# Patient Record
Sex: MALE | Race: WHITE | NOT HISPANIC OR LATINO | Employment: OTHER | URBAN - METROPOLITAN AREA
[De-identification: names, ages, dates, MRNs, and addresses within clinical notes are randomized per-mention and may not be internally consistent; named-entity substitution may affect disease eponyms.]

---

## 2023-05-17 ENCOUNTER — OFFICE VISIT (OUTPATIENT)
Dept: PRIMARY CARE | Facility: CLINIC | Age: 78
End: 2023-05-17
Payer: MEDICARE

## 2023-05-17 ENCOUNTER — LAB (OUTPATIENT)
Dept: LAB | Facility: LAB | Age: 78
End: 2023-05-17
Payer: MEDICARE

## 2023-05-17 VITALS
WEIGHT: 237 LBS | TEMPERATURE: 97.5 F | DIASTOLIC BLOOD PRESSURE: 84 MMHG | BODY MASS INDEX: 37.2 KG/M2 | OXYGEN SATURATION: 96 % | SYSTOLIC BLOOD PRESSURE: 139 MMHG | HEIGHT: 67 IN | HEART RATE: 79 BPM

## 2023-05-17 DIAGNOSIS — Z00.00 VISIT FOR PREVENTIVE HEALTH EXAMINATION: ICD-10-CM

## 2023-05-17 DIAGNOSIS — I10 BENIGN ESSENTIAL HYPERTENSION: ICD-10-CM

## 2023-05-17 DIAGNOSIS — Z12.5 ENCOUNTER FOR SCREENING FOR MALIGNANT NEOPLASM OF PROSTATE: ICD-10-CM

## 2023-05-17 DIAGNOSIS — Z00.00 VISIT FOR PREVENTIVE HEALTH EXAMINATION: Primary | ICD-10-CM

## 2023-05-17 DIAGNOSIS — N18.31 STAGE 3A CHRONIC KIDNEY DISEASE (MULTI): ICD-10-CM

## 2023-05-17 DIAGNOSIS — E78.00 HYPERCHOLESTEROLEMIA: ICD-10-CM

## 2023-05-17 DIAGNOSIS — M54.2 NECK PAIN, CHRONIC: ICD-10-CM

## 2023-05-17 DIAGNOSIS — K21.9 GASTROESOPHAGEAL REFLUX DISEASE WITHOUT ESOPHAGITIS: ICD-10-CM

## 2023-05-17 DIAGNOSIS — N40.1 BENIGN PROSTATIC HYPERPLASIA WITH LOWER URINARY TRACT SYMPTOMS, SYMPTOM DETAILS UNSPECIFIED: ICD-10-CM

## 2023-05-17 DIAGNOSIS — G89.29 NECK PAIN, CHRONIC: ICD-10-CM

## 2023-05-17 PROBLEM — K63.5 COLON POLYP: Status: ACTIVE | Noted: 2023-05-17

## 2023-05-17 PROBLEM — N18.30 CHRONIC KIDNEY DISEASE, STAGE 3 (MULTI): Status: ACTIVE | Noted: 2023-05-17

## 2023-05-17 PROBLEM — N40.0 BPH (BENIGN PROSTATIC HYPERPLASIA): Status: ACTIVE | Noted: 2023-05-17

## 2023-05-17 PROBLEM — G47.33 OSA ON CPAP: Status: ACTIVE | Noted: 2023-05-17

## 2023-05-17 PROBLEM — M19.90 OSTEOARTHRITIS: Status: ACTIVE | Noted: 2023-05-17

## 2023-05-17 LAB
ALANINE AMINOTRANSFERASE (SGPT) (U/L) IN SER/PLAS: 18 U/L (ref 10–52)
ALBUMIN (G/DL) IN SER/PLAS: 4.6 G/DL (ref 3.4–5)
ALKALINE PHOSPHATASE (U/L) IN SER/PLAS: 87 U/L (ref 33–136)
ANION GAP IN SER/PLAS: 14 MMOL/L (ref 10–20)
ASPARTATE AMINOTRANSFERASE (SGOT) (U/L) IN SER/PLAS: 22 U/L (ref 9–39)
BILIRUBIN TOTAL (MG/DL) IN SER/PLAS: 0.5 MG/DL (ref 0–1.2)
CALCIUM (MG/DL) IN SER/PLAS: 10.1 MG/DL (ref 8.6–10.6)
CARBON DIOXIDE, TOTAL (MMOL/L) IN SER/PLAS: 28 MMOL/L (ref 21–32)
CHLORIDE (MMOL/L) IN SER/PLAS: 104 MMOL/L (ref 98–107)
CHOLESTEROL (MG/DL) IN SER/PLAS: 148 MG/DL (ref 0–199)
CHOLESTEROL IN HDL (MG/DL) IN SER/PLAS: 44.7 MG/DL
CHOLESTEROL/HDL RATIO: 3.3
CREATININE (MG/DL) IN SER/PLAS: 1.5 MG/DL (ref 0.5–1.3)
ERYTHROCYTE DISTRIBUTION WIDTH (RATIO) BY AUTOMATED COUNT: 13.2 % (ref 11.5–14.5)
ERYTHROCYTE MEAN CORPUSCULAR HEMOGLOBIN CONCENTRATION (G/DL) BY AUTOMATED: 30.4 G/DL (ref 32–36)
ERYTHROCYTE MEAN CORPUSCULAR VOLUME (FL) BY AUTOMATED COUNT: 96 FL (ref 80–100)
ERYTHROCYTES (10*6/UL) IN BLOOD BY AUTOMATED COUNT: 5.27 X10E12/L (ref 4.5–5.9)
GFR MALE: 47 ML/MIN/1.73M2
GLUCOSE (MG/DL) IN SER/PLAS: 110 MG/DL (ref 74–99)
HEMATOCRIT (%) IN BLOOD BY AUTOMATED COUNT: 50.6 % (ref 41–52)
HEMOGLOBIN (G/DL) IN BLOOD: 15.4 G/DL (ref 13.5–17.5)
LDL: 88 MG/DL (ref 0–99)
LEUKOCYTES (10*3/UL) IN BLOOD BY AUTOMATED COUNT: 7.4 X10E9/L (ref 4.4–11.3)
MUCUS, URINE: NORMAL /LPF
NRBC (PER 100 WBCS) BY AUTOMATED COUNT: 0 /100 WBC (ref 0–0)
PLATELETS (10*3/UL) IN BLOOD AUTOMATED COUNT: 265 X10E9/L (ref 150–450)
POTASSIUM (MMOL/L) IN SER/PLAS: 5.1 MMOL/L (ref 3.5–5.3)
PROSTATE SPECIFIC ANTIGEN,SCREEN: 1.53 NG/ML (ref 0–4)
PROTEIN TOTAL: 7.8 G/DL (ref 6.4–8.2)
RBC, URINE: NORMAL /HPF (ref 0–5)
SODIUM (MMOL/L) IN SER/PLAS: 141 MMOL/L (ref 136–145)
SQUAMOUS EPITHELIAL CELLS, URINE: <1 /HPF
THYROTROPIN (MIU/L) IN SER/PLAS BY DETECTION LIMIT <= 0.05 MIU/L: 1.6 MIU/L (ref 0.44–3.98)
TRIGLYCERIDE (MG/DL) IN SER/PLAS: 76 MG/DL (ref 0–149)
UREA NITROGEN (MG/DL) IN SER/PLAS: 20 MG/DL (ref 6–23)
VLDL: 15 MG/DL (ref 0–40)
WBC, URINE: 1 /HPF (ref 0–5)

## 2023-05-17 PROCEDURE — 99214 OFFICE O/P EST MOD 30 MIN: CPT | Performed by: FAMILY MEDICINE

## 2023-05-17 PROCEDURE — 80061 LIPID PANEL: CPT

## 2023-05-17 PROCEDURE — 80053 COMPREHEN METABOLIC PANEL: CPT

## 2023-05-17 PROCEDURE — 84153 ASSAY OF PSA TOTAL: CPT

## 2023-05-17 PROCEDURE — 1157F ADVNC CARE PLAN IN RCRD: CPT | Performed by: FAMILY MEDICINE

## 2023-05-17 PROCEDURE — 81001 URINALYSIS AUTO W/SCOPE: CPT

## 2023-05-17 PROCEDURE — 3079F DIAST BP 80-89 MM HG: CPT | Performed by: FAMILY MEDICINE

## 2023-05-17 PROCEDURE — G0444 DEPRESSION SCREEN ANNUAL: HCPCS | Performed by: FAMILY MEDICINE

## 2023-05-17 PROCEDURE — 84443 ASSAY THYROID STIM HORMONE: CPT

## 2023-05-17 PROCEDURE — 99497 ADVNCD CARE PLAN 30 MIN: CPT | Performed by: FAMILY MEDICINE

## 2023-05-17 PROCEDURE — 36415 COLL VENOUS BLD VENIPUNCTURE: CPT

## 2023-05-17 PROCEDURE — 1159F MED LIST DOCD IN RCRD: CPT | Performed by: FAMILY MEDICINE

## 2023-05-17 PROCEDURE — G0439 PPPS, SUBSEQ VISIT: HCPCS | Performed by: FAMILY MEDICINE

## 2023-05-17 PROCEDURE — 85027 COMPLETE CBC AUTOMATED: CPT

## 2023-05-17 PROCEDURE — 1160F RVW MEDS BY RX/DR IN RCRD: CPT | Performed by: FAMILY MEDICINE

## 2023-05-17 PROCEDURE — 3075F SYST BP GE 130 - 139MM HG: CPT | Performed by: FAMILY MEDICINE

## 2023-05-17 PROCEDURE — 1170F FXNL STATUS ASSESSED: CPT | Performed by: FAMILY MEDICINE

## 2023-05-17 RX ORDER — EPINEPHRINE 0.22MG
100 AEROSOL WITH ADAPTER (ML) INHALATION 2 TIMES DAILY
COMMUNITY
Start: 2014-06-04

## 2023-05-17 RX ORDER — TAMSULOSIN HYDROCHLORIDE 0.4 MG/1
0.4 CAPSULE ORAL NIGHTLY
Qty: 90 CAPSULE | Refills: 3 | Status: SHIPPED | OUTPATIENT
Start: 2023-05-17 | End: 2024-05-08 | Stop reason: SDUPTHER

## 2023-05-17 RX ORDER — OMEPRAZOLE 20 MG/1
20 CAPSULE, DELAYED RELEASE ORAL
Qty: 90 CAPSULE | Refills: 3 | Status: SHIPPED | OUTPATIENT
Start: 2023-05-17 | End: 2024-05-08 | Stop reason: SDUPTHER

## 2023-05-17 RX ORDER — ATORVASTATIN CALCIUM 10 MG/1
10 TABLET, FILM COATED ORAL DAILY
Qty: 90 TABLET | Refills: 3 | Status: SHIPPED | OUTPATIENT
Start: 2023-05-17 | End: 2024-05-08 | Stop reason: SDUPTHER

## 2023-05-17 RX ORDER — FLUTICASONE PROPIONATE 50 MCG
1 SPRAY, SUSPENSION (ML) NASAL 2 TIMES DAILY
COMMUNITY
Start: 2022-06-06

## 2023-05-17 RX ORDER — AMLODIPINE BESYLATE 5 MG/1
1 TABLET ORAL DAILY
COMMUNITY
Start: 2021-07-23 | End: 2023-05-17 | Stop reason: ALTCHOICE

## 2023-05-17 RX ORDER — DAPAGLIFLOZIN 10 MG/1
10 TABLET, FILM COATED ORAL
COMMUNITY
Start: 2022-12-01 | End: 2023-06-05 | Stop reason: SDUPTHER

## 2023-05-17 RX ORDER — LATANOPROST 50 UG/ML
SOLUTION/ DROPS OPHTHALMIC
COMMUNITY
Start: 2013-04-15

## 2023-05-17 RX ORDER — AMLODIPINE BESYLATE 10 MG/1
10 TABLET ORAL DAILY
Qty: 30 TABLET | Refills: 3 | Status: SHIPPED | OUTPATIENT
Start: 2023-05-17 | End: 2023-07-31 | Stop reason: SDUPTHER

## 2023-05-17 RX ORDER — OMEPRAZOLE 20 MG/1
20 CAPSULE, DELAYED RELEASE ORAL
COMMUNITY
Start: 2022-06-07 | End: 2023-05-17 | Stop reason: SDUPTHER

## 2023-05-17 RX ORDER — AMLODIPINE BESYLATE 5 MG/1
5 TABLET ORAL DAILY
Status: CANCELLED | OUTPATIENT
Start: 2023-05-17

## 2023-05-17 RX ORDER — ATORVASTATIN CALCIUM 10 MG/1
1 TABLET, FILM COATED ORAL DAILY
COMMUNITY
Start: 2014-05-13 | End: 2023-05-17 | Stop reason: SDUPTHER

## 2023-05-17 RX ORDER — TAMSULOSIN HYDROCHLORIDE 0.4 MG/1
1 CAPSULE ORAL NIGHTLY
COMMUNITY
Start: 2016-06-13 | End: 2023-05-17 | Stop reason: SDUPTHER

## 2023-05-17 RX ORDER — DEXTROMETHORPHAN HYDROBROMIDE, GUAIFENESIN 5; 100 MG/5ML; MG/5ML
LIQUID ORAL
COMMUNITY
Start: 2019-07-03

## 2023-05-17 RX ORDER — HYDROCORTISONE 25 MG/G
CREAM TOPICAL
COMMUNITY
Start: 2020-07-22 | End: 2023-10-24 | Stop reason: SDUPTHER

## 2023-05-17 RX ORDER — LISINOPRIL 20 MG/1
1 TABLET ORAL DAILY
COMMUNITY
Start: 2014-04-21 | End: 2023-07-31 | Stop reason: SDUPTHER

## 2023-05-17 RX ORDER — CHOLECALCIFEROL (VITAMIN D3) 25 MCG
TABLET ORAL
COMMUNITY
Start: 2020-04-13

## 2023-05-17 ASSESSMENT — ACTIVITIES OF DAILY LIVING (ADL)
BATHING: INDEPENDENT
DRESSING: INDEPENDENT
TAKING_MEDICATION: INDEPENDENT
GROCERY_SHOPPING: INDEPENDENT
DOING_HOUSEWORK: INDEPENDENT
MANAGING_FINANCES: INDEPENDENT

## 2023-05-17 ASSESSMENT — PATIENT HEALTH QUESTIONNAIRE - PHQ9
2. FEELING DOWN, DEPRESSED OR HOPELESS: NOT AT ALL
SUM OF ALL RESPONSES TO PHQ9 QUESTIONS 1 AND 2: 0
1. LITTLE INTEREST OR PLEASURE IN DOING THINGS: NOT AT ALL
1. LITTLE INTEREST OR PLEASURE IN DOING THINGS: NOT AT ALL
SUM OF ALL RESPONSES TO PHQ9 QUESTIONS 1 AND 2: 0
2. FEELING DOWN, DEPRESSED OR HOPELESS: NOT AT ALL

## 2023-05-17 ASSESSMENT — LIFESTYLE VARIABLES
HOW OFTEN DO YOU HAVE A DRINK CONTAINING ALCOHOL: 4 OR MORE TIMES A WEEK
SKIP TO QUESTIONS 9-10: 1
HOW MANY STANDARD DRINKS CONTAINING ALCOHOL DO YOU HAVE ON A TYPICAL DAY: 1 OR 2
HOW OFTEN DO YOU HAVE SIX OR MORE DRINKS ON ONE OCCASION: NEVER
AUDIT-C TOTAL SCORE: 4

## 2023-05-17 NOTE — PROGRESS NOTES
Subjective   Patient ID: Freedom Sahu is a 77 y.o. male who presents for Medicare Annual Wellness Visit Subsequent.    Assessment/Plan     Problem List Items Addressed This Visit          Nervous    Neck pain, chronic    Relevant Orders    Referral to Physical Therapy       Circulatory    Benign essential hypertension    Relevant Medications    amLODIPine (Norvasc) 10 mg tablet    Other Relevant Orders    TSH with reflex to Free T4 if abnormal    Lipid Panel       Digestive    GERD (gastroesophageal reflux disease)    Relevant Medications    omeprazole (PriLOSEC) 20 mg DR capsule       Genitourinary    BPH (benign prostatic hyperplasia)    Relevant Medications    tamsulosin (Flomax) 0.4 mg 24 hr capsule    Chronic kidney disease, stage 3 (CMS/HCC)    Relevant Orders    Follow Up In Primary Care    Prostate Specific Antigen, Screen    Urinalysis Microscopic Only    TSH with reflex to Free T4 if abnormal    Lipid Panel    Comprehensive Metabolic Panel    CBC    Referral to Physical Therapy       Other    Visit for preventive health examination - Primary    Relevant Orders    Follow Up In Primary Care    Prostate Specific Antigen, Screen    Urinalysis Microscopic Only    TSH with reflex to Free T4 if abnormal    Lipid Panel    Comprehensive Metabolic Panel    CBC    Referral to Physical Therapy    Hypercholesterolemia    Relevant Medications    atorvastatin (Lipitor) 10 mg tablet    Other Relevant Orders    TSH with reflex to Free T4 if abnormal    Lipid Panel     Other Visit Diagnoses       Encounter for screening for malignant neoplasm of prostate        Relevant Orders    Prostate Specific Antigen, Screen          Up-to-date with pneumonia vaccine and received COVID-19 vaccine  Fasting lab work today  Physical therapy information provided  Discussed about diet physical activity weight loss  Discussed about aspirin use  Discussed about diet and exercise     DEXA scan- 2017 within normal limits  AAA screening -2017  no acute abnormality  Lab work and call patient with abnormal result  Come back early with any new sign and symptoms  Patient understands and in agreement with plan.    HPI      77-year-old male here for Medicare wellness visit and follow-up on    GERD  BPH  Vitamin D deficiency  Hypertension  Hyperlipidemia   Glaucoma  MAL using CPAP  Cigar smoker  Obesity  Tubular Adenoma -colonoscopy done in 2020  Osteoarthritis status post bilateral knee and bilateral hip replacement and left shoulder  Chronic low back pain  Generalized arthralgias secondary to osteoarthritis    Ckd stage 3 - seeping dr. Stewart - was ref to he-onc    Chronic neck pain - more on left side - no weakness will consider PT     Repeat blood pressure is 139/84  We will increase amlodipine as blood pressure usually runs high     Chronic left-sided neck pain  Taking Mobic 3 times a week  Exercising regularly  Otherwise no new sign and symptoms     Advance directives:. Advanced Care Planning discussed and documented advance care plan or surrogate decision maker documented in the medical record.   A Conversation about Advance directives of at least 16 minutes has occurred. Actual time spent: I spent >16 minutes discussing Advance Care Planning including the explanation and discussion of advance directives. If patient does not have current up to date documents, examples and information provided on how to create both Living Will and Power of . Patient was encouraged to work on completing these documents.  Conversation with: The conversation with the patient and/or participants was voluntary.  Documents discussed and/or completed: Living Will was discussed with participants. Healthcare POA was discussed with participants. Patient educated on how to obtain Living Will and Power of . Discussed patient end of life choices.   Patient has living will and patient's daughter hay is healthcare power of .  Patient is full code.    No Known  "Allergies    Current Outpatient Medications   Medication Sig Dispense Refill    acetaminophen (Tylenol 8 Hour) 650 mg ER tablet Take by mouth.      cholecalciferol (Vitamin D-3) 25 MCG (1000 UT) tablet Take by mouth.      coenzyme Q-10 100 mg capsule Take 1 capsule (100 mg) by mouth twice a day.      Farxiga 10 mg 1 tablet (10 mg).      fluticasone (Flonase) 50 mcg/actuation nasal spray Administer 1 spray into affected nostril(s) 2 times a day.      hydrocortisone 2.5 % cream Apply topically.      latanoprost (Xalatan) 0.005 % ophthalmic solution       lisinopril 20 mg tablet Take 1 tablet (20 mg) by mouth once daily.      amLODIPine (Norvasc) 10 mg tablet Take 1 tablet (10 mg) by mouth once daily. 30 tablet 3    atorvastatin (Lipitor) 10 mg tablet Take 1 tablet (10 mg) by mouth once daily. 90 tablet 3    omeprazole (PriLOSEC) 20 mg DR capsule Take 1 capsule (20 mg) by mouth once daily. 90 capsule 3    tamsulosin (Flomax) 0.4 mg 24 hr capsule Take 1 capsule (0.4 mg) by mouth once daily at bedtime. 90 capsule 3     No current facility-administered medications for this visit.       Objective   Visit Vitals  /84   Pulse 79   Temp 36.4 °C (97.5 °F)   Ht 1.702 m (5' 7\")   Wt 108 kg (237 lb)   SpO2 96%   BMI 37.12 kg/m²   Smoking Status Some Days   BSA 2.26 m²     Physical Exam  Constitutional:       General: He is not in acute distress.     Appearance: Normal appearance.   HENT:      Head: Normocephalic and atraumatic.      Nose: Nose normal.   Eyes:      Extraocular Movements: Extraocular movements intact.      Conjunctiva/sclera: Conjunctivae normal.   Cardiovascular:      Rate and Rhythm: Normal rate and regular rhythm.   Pulmonary:      Effort: Pulmonary effort is normal.      Breath sounds: Normal breath sounds.   Skin:     General: Skin is warm.   Neurological:      Mental Status: He is alert and oriented to person, place, and time.   Psychiatric:         Mood and Affect: Mood normal.         Behavior: " Behavior normal.   Immunization History   Administered Date(s) Administered    Influenza, High Dose Seasonal, Preservative Free 12/11/2017, 09/12/2018, 10/03/2018, 09/10/2019    Influenza, High-dose Seasonal, Quadrivalent, Preservative Free 09/19/2022    Influenza, Seasonal, Quadrivalent, Adjuvanted 10/25/2021    Influenza, Unspecified 12/10/2009, 09/04/2013    Influenza, trivalent, adjuvanted 11/19/2019    Moderna SARS-CoV-2 Vaccination 01/24/2021, 02/24/2021, 12/28/2021    Pneumococcal Conjugate PCV 13 06/23/2017    Pneumococcal Polysaccharide PPSV23 05/10/2010, 04/17/2013       Review of Systems    No visits with results within 4 Month(s) from this visit.   Latest known visit with results is:   Legacy Encounter on 11/28/2022   Component Date Value Ref Range Status    Uric Acid 11/28/2022 7.2  4.0 - 7.5 mg/dL Final    Glucose 11/28/2022 85  74 - 99 mg/dL Final    Sodium 11/28/2022 140  136 - 145 mmol/L Final    Potassium 11/28/2022 4.4  3.5 - 5.3 mmol/L Final    Chloride 11/28/2022 105  98 - 107 mmol/L Final    Bicarbonate 11/28/2022 27  21 - 32 mmol/L Final    Anion Gap 11/28/2022 12  10 - 20 mmol/L Final    Urea Nitrogen 11/28/2022 24 (H)  6 - 23 mg/dL Final    Creatinine 11/28/2022 1.58 (H)  0.50 - 1.30 mg/dL Final    GFR MALE 11/28/2022 44 (A)  >90 mL/min/1.73m2 Final    Calcium 11/28/2022 10.2  8.6 - 10.6 mg/dL Final       Radiology: Reviewed imaging in powerchart.  No results found.    Family History   Problem Relation Name Age of Onset    Hypertension Mother Margaret Sahu      Social History     Socioeconomic History    Marital status:      Spouse name: None    Number of children: None    Years of education: None    Highest education level: None   Occupational History    None   Tobacco Use    Smoking status: Some Days     Types: Cigars    Smokeless tobacco: Never    Tobacco comments:     5-6 cigars every week for 50years   Vaping Use    Vaping status: None   Substance and Sexual Activity    Alcohol  use: Yes     Alcohol/week: 52.0 standard drinks of alcohol     Types: 2 Cans of beer, 50 Shots of liquor per week    Drug use: Never    Sexual activity: Not Currently     Partners: Female   Other Topics Concern    None   Social History Narrative    None     Social Determinants of Health     Financial Resource Strain: Not on file   Food Insecurity: Not on file   Transportation Needs: Not on file   Physical Activity: Not on file   Stress: Not on file   Social Connections: Not on file   Intimate Partner Violence: Not on file   Housing Stability: Not on file     Past Medical History:   Diagnosis Date    Cataract 2008    Glaucoma 1995    Hypertension 1990     Past Surgical History:   Procedure Laterality Date    CATARACT EXTRACTION  06/09/2014    Cataract Surgery    CIRCUMCISION, PRIMARY  1944    FRACTURE SURGERY  1954    JOINT REPLACEMENT  1995    OTHER SURGICAL HISTORY  06/09/2014    Treatment Of Femoral Fracture    OTHER SURGICAL HISTORY  06/09/2014    Incision And Drainage Of Skin Abscess Neck    OTHER SURGICAL HISTORY  06/09/2014    Biopsy Skin    OTHER SURGICAL HISTORY  09/28/2022    Shoulder replacement    TOTAL HIP ARTHROPLASTY  06/09/2014    Total Hip Replacement       Charting was completed using voice recognition technology and may include unintended errors.

## 2023-06-05 DIAGNOSIS — N18.31 STAGE 3A CHRONIC KIDNEY DISEASE (MULTI): ICD-10-CM

## 2023-06-05 RX ORDER — DAPAGLIFLOZIN 10 MG/1
10 TABLET, FILM COATED ORAL DAILY
Qty: 14 TABLET | Refills: 0 | Status: SHIPPED | OUTPATIENT
Start: 2023-06-05

## 2023-07-31 DIAGNOSIS — I10 BENIGN ESSENTIAL HYPERTENSION: ICD-10-CM

## 2023-07-31 RX ORDER — LISINOPRIL 20 MG/1
20 TABLET ORAL DAILY
Qty: 90 TABLET | Refills: 2 | Status: SHIPPED | OUTPATIENT
Start: 2023-07-31 | End: 2024-05-08 | Stop reason: SDUPTHER

## 2023-07-31 RX ORDER — AMLODIPINE BESYLATE 10 MG/1
10 TABLET ORAL DAILY
Qty: 90 TABLET | Refills: 2 | Status: SHIPPED | OUTPATIENT
Start: 2023-07-31 | End: 2024-05-08 | Stop reason: SDUPTHER

## 2023-10-24 DIAGNOSIS — K64.9 ACUTE HEMORRHOID: ICD-10-CM

## 2023-10-25 RX ORDER — HYDROCORTISONE 25 MG/G
CREAM TOPICAL
Qty: 28 G | Refills: 2 | Status: SHIPPED | OUTPATIENT
Start: 2023-10-25 | End: 2023-11-09

## 2024-05-08 ENCOUNTER — TELEPHONE (OUTPATIENT)
Dept: PRIMARY CARE | Facility: CLINIC | Age: 79
End: 2024-05-08
Payer: MEDICARE

## 2024-05-08 DIAGNOSIS — I10 BENIGN ESSENTIAL HYPERTENSION: ICD-10-CM

## 2024-05-08 DIAGNOSIS — K21.9 GASTROESOPHAGEAL REFLUX DISEASE WITHOUT ESOPHAGITIS: ICD-10-CM

## 2024-05-08 DIAGNOSIS — E78.00 HYPERCHOLESTEROLEMIA: ICD-10-CM

## 2024-05-08 DIAGNOSIS — N40.1 BENIGN PROSTATIC HYPERPLASIA WITH LOWER URINARY TRACT SYMPTOMS, SYMPTOM DETAILS UNSPECIFIED: ICD-10-CM

## 2024-05-09 RX ORDER — TAMSULOSIN HYDROCHLORIDE 0.4 MG/1
0.4 CAPSULE ORAL NIGHTLY
Qty: 90 CAPSULE | Refills: 0 | Status: SHIPPED | OUTPATIENT
Start: 2024-05-09 | End: 2025-05-09

## 2024-05-09 RX ORDER — OMEPRAZOLE 20 MG/1
20 CAPSULE, DELAYED RELEASE ORAL
Qty: 90 CAPSULE | Refills: 0 | Status: SHIPPED | OUTPATIENT
Start: 2024-05-09 | End: 2025-05-09

## 2024-05-09 RX ORDER — AMLODIPINE BESYLATE 10 MG/1
10 TABLET ORAL DAILY
Qty: 90 TABLET | Refills: 0 | Status: SHIPPED | OUTPATIENT
Start: 2024-05-09 | End: 2025-05-09

## 2024-05-09 RX ORDER — ATORVASTATIN CALCIUM 10 MG/1
10 TABLET, FILM COATED ORAL DAILY
Qty: 90 TABLET | Refills: 0 | Status: SHIPPED | OUTPATIENT
Start: 2024-05-09 | End: 2025-05-09

## 2024-05-09 RX ORDER — LISINOPRIL 20 MG/1
20 TABLET ORAL DAILY
Qty: 90 TABLET | Refills: 0 | Status: SHIPPED | OUTPATIENT
Start: 2024-05-09 | End: 2025-05-09

## 2024-06-12 ENCOUNTER — APPOINTMENT (OUTPATIENT)
Dept: PRIMARY CARE | Facility: CLINIC | Age: 79
End: 2024-06-12
Payer: MEDICARE

## 2024-06-12 VITALS
OXYGEN SATURATION: 95 % | WEIGHT: 242 LBS | BODY MASS INDEX: 37.98 KG/M2 | HEIGHT: 67 IN | SYSTOLIC BLOOD PRESSURE: 138 MMHG | HEART RATE: 71 BPM | DIASTOLIC BLOOD PRESSURE: 77 MMHG

## 2024-06-12 DIAGNOSIS — E66.1 CLASS 2 DRUG-INDUCED OBESITY WITHOUT SERIOUS COMORBIDITY WITH BODY MASS INDEX (BMI) OF 37.0 TO 37.9 IN ADULT: ICD-10-CM

## 2024-06-12 DIAGNOSIS — Z12.5 ENCOUNTER FOR SCREENING FOR MALIGNANT NEOPLASM OF PROSTATE: ICD-10-CM

## 2024-06-12 DIAGNOSIS — I10 BENIGN ESSENTIAL HYPERTENSION: ICD-10-CM

## 2024-06-12 DIAGNOSIS — N18.31 STAGE 3A CHRONIC KIDNEY DISEASE (MULTI): ICD-10-CM

## 2024-06-12 DIAGNOSIS — Z00.00 VISIT FOR PREVENTIVE HEALTH EXAMINATION: Primary | ICD-10-CM

## 2024-06-12 DIAGNOSIS — K64.9 ACUTE HEMORRHOID: ICD-10-CM

## 2024-06-12 DIAGNOSIS — J06.9 VIRAL UPPER RESPIRATORY TRACT INFECTION: ICD-10-CM

## 2024-06-12 DIAGNOSIS — E78.00 HYPERCHOLESTEROLEMIA: ICD-10-CM

## 2024-06-12 DIAGNOSIS — N40.1 BENIGN PROSTATIC HYPERPLASIA WITH LOWER URINARY TRACT SYMPTOMS, SYMPTOM DETAILS UNSPECIFIED: ICD-10-CM

## 2024-06-12 PROCEDURE — 1158F ADVNC CARE PLAN TLK DOCD: CPT | Performed by: FAMILY MEDICINE

## 2024-06-12 PROCEDURE — 1157F ADVNC CARE PLAN IN RCRD: CPT | Performed by: FAMILY MEDICINE

## 2024-06-12 PROCEDURE — 1160F RVW MEDS BY RX/DR IN RCRD: CPT | Performed by: FAMILY MEDICINE

## 2024-06-12 PROCEDURE — 1159F MED LIST DOCD IN RCRD: CPT | Performed by: FAMILY MEDICINE

## 2024-06-12 PROCEDURE — G0444 DEPRESSION SCREEN ANNUAL: HCPCS | Performed by: FAMILY MEDICINE

## 2024-06-12 PROCEDURE — 99497 ADVNCD CARE PLAN 30 MIN: CPT | Performed by: FAMILY MEDICINE

## 2024-06-12 PROCEDURE — 1123F ACP DISCUSS/DSCN MKR DOCD: CPT | Performed by: FAMILY MEDICINE

## 2024-06-12 PROCEDURE — 3075F SYST BP GE 130 - 139MM HG: CPT | Performed by: FAMILY MEDICINE

## 2024-06-12 PROCEDURE — G0439 PPPS, SUBSEQ VISIT: HCPCS | Performed by: FAMILY MEDICINE

## 2024-06-12 PROCEDURE — 3078F DIAST BP <80 MM HG: CPT | Performed by: FAMILY MEDICINE

## 2024-06-12 PROCEDURE — 99214 OFFICE O/P EST MOD 30 MIN: CPT | Performed by: FAMILY MEDICINE

## 2024-06-12 RX ORDER — LORATADINE 10 MG/1
10 TABLET ORAL DAILY
Qty: 30 TABLET | Refills: 0 | Status: SHIPPED | OUTPATIENT
Start: 2024-06-12 | End: 2024-09-10

## 2024-06-12 RX ORDER — MONTELUKAST SODIUM 10 MG/1
10 TABLET ORAL NIGHTLY
Qty: 30 TABLET | Refills: 0 | Status: SHIPPED | OUTPATIENT
Start: 2024-06-12 | End: 2024-12-09

## 2024-06-12 RX ORDER — CALCITRIOL 0.25 UG/1
0.25 CAPSULE ORAL
COMMUNITY
Start: 2024-03-11 | End: 2025-02-10

## 2024-06-12 RX ORDER — HYDROCORTISONE 25 MG/G
CREAM TOPICAL
Qty: 28 G | Refills: 2 | Status: SHIPPED | OUTPATIENT
Start: 2024-06-12 | End: 2024-06-12 | Stop reason: SDUPTHER

## 2024-06-12 RX ORDER — HYDROCORTISONE 25 MG/G
CREAM TOPICAL
Qty: 28 G | Refills: 2 | Status: SHIPPED | OUTPATIENT
Start: 2024-06-12 | End: 2024-06-27

## 2024-06-12 ASSESSMENT — LIFESTYLE VARIABLES
HOW OFTEN DO YOU HAVE A DRINK CONTAINING ALCOHOL: 4 OR MORE TIMES A WEEK
HOW OFTEN DO YOU HAVE SIX OR MORE DRINKS ON ONE OCCASION: NEVER
SKIP TO QUESTIONS 9-10: 1
HOW MANY STANDARD DRINKS CONTAINING ALCOHOL DO YOU HAVE ON A TYPICAL DAY: 1 OR 2
AUDIT-C TOTAL SCORE: 4

## 2024-06-12 ASSESSMENT — PATIENT HEALTH QUESTIONNAIRE - PHQ9
1. LITTLE INTEREST OR PLEASURE IN DOING THINGS: NOT AT ALL
2. FEELING DOWN, DEPRESSED OR HOPELESS: NOT AT ALL
SUM OF ALL RESPONSES TO PHQ9 QUESTIONS 1 AND 2: 0

## 2024-06-12 NOTE — PROGRESS NOTES
Subjective   Patient ID: Freedom Sahu is a 78 y.o. male who presents for Medicare Annual Wellness Visit Subsequent.    Assessment/Plan     Problem List Items Addressed This Visit       Visit for preventive health examination - Primary    Relevant Orders    Prostate Specific Antigen, Screen    TSH with reflex to Free T4 if abnormal    Lipid Panel    Comprehensive Metabolic Panel    CBC    Urinalysis with Reflex Microscopic    Benign essential hypertension    Relevant Orders    TSH with reflex to Free T4 if abnormal    Lipid Panel    Comprehensive Metabolic Panel    CBC    Urinalysis with Reflex Microscopic    BPH (benign prostatic hyperplasia)    Chronic kidney disease, stage 3 (Multi)    Hypercholesterolemia    Relevant Orders    TSH with reflex to Free T4 if abnormal    Lipid Panel    Comprehensive Metabolic Panel    CBC    Urinalysis with Reflex Microscopic     Other Visit Diagnoses       Class 2 drug-induced obesity without serious comorbidity with body mass index (BMI) of 37.0 to 37.9 in adult        Encounter for screening for malignant neoplasm of prostate        Relevant Orders    Prostate Specific Antigen, Screen    Acute hemorrhoid        Relevant Medications    hydrocortisone 2.5 % cream          Time spent around 15 minutes obtaining and discussing depression screening using PHQ 9 questions with results documented in the chart    Up-to-date with pneumonia vaccine and received COVID-19 vaccine  Fasting lab work today  Discussed about diet physical activity weight loss  Discussed about aspirin use  Discussed about diet and exercise    DEXA scan- 2017 within normal limits  AAA screening -2017 no acute abnormality  Lab work and call patient with abnormal result  Come back early with any new sign and symptoms  Patient understands and in agreement with plan.    HPI      78-year-old male here for Medicare wellness visit and follow-up on    GERD  BPH  Vitamin D deficiency  Hypertension  Hyperlipidemia    Glaucoma  MAL using CPAP  Cigar smoker  Obesity  Tubular Adenoma -colonoscopy done in 2020  Osteoarthritis status post bilateral knee and bilateral hip replacement and left shoulder  Chronic low back pain  Generalized arthralgias secondary to osteoarthritis    Ckd stage 3 - seeping dr. Stewart - was ref to he-onc    Chronic left sided neck pain - more on left side - no weakness      Complaining of low back pain mostly secondary to degenerative disc disease with facet arthritis discussed about exercise information provided  Needs refill on hemorrhoidal cream currently hemorrhoids are not present  Otherwise no new sign and symptoms     Advance directives:. Advanced Care Planning discussed and documented advance care plan or surrogate decision maker documented in the medical record.   A Conversation about Advance directives of at least 16 minutes has occurred. Actual time spent: I spent >16 minutes discussing Advance Care Planning including the explanation and discussion of advance directives. If patient does not have current up to date documents, examples and information provided on how to create both Living Will and Power of . Patient was encouraged to work on completing these documents.  Conversation with: The conversation with the patient and/or participants was voluntary.  Documents discussed and/or completed: Living Will was discussed with participants. Healthcare POA was discussed with participants. Patient educated on how to obtain Living Will and Power of . Discussed patient end of life choices.   Patient has living will and patient's daughter hay is healthcare power of .  Patient is full code.    No Known Allergies    Current Outpatient Medications   Medication Sig Dispense Refill    acetaminophen (Tylenol 8 Hour) 650 mg ER tablet Take by mouth.      amLODIPine (Norvasc) 10 mg tablet Take 1 tablet (10 mg) by mouth once daily. 90 tablet 0    atorvastatin (Lipitor) 10 mg tablet Take 1  "tablet (10 mg) by mouth once daily. 90 tablet 0    calcitriol (Rocaltrol) 0.25 mcg capsule Take 1 capsule (0.25 mcg) by mouth.      cholecalciferol (Vitamin D-3) 25 MCG (1000 UT) tablet Take by mouth.      coenzyme Q-10 100 mg capsule Take 1 capsule (100 mg) by mouth twice a day.      dapagliflozin (Farxiga) 10 mg Take 1 tablet (10 mg) by mouth once daily. 14 tablet 0    latanoprost (Xalatan) 0.005 % ophthalmic solution       lisinopril 20 mg tablet Take 1 tablet (20 mg) by mouth once daily. 90 tablet 0    omeprazole (PriLOSEC) 20 mg DR capsule Take 1 capsule (20 mg) by mouth once daily. 90 capsule 0    tamsulosin (Flomax) 0.4 mg 24 hr capsule Take 1 capsule (0.4 mg) by mouth once daily at bedtime. 90 capsule 0    fluticasone (Flonase) 50 mcg/actuation nasal spray Administer 1 spray into affected nostril(s) 2 times a day.      hydrocortisone 2.5 % cream Apply topically 3 times a day for 15 days. 28 g 2     No current facility-administered medications for this visit.       Objective   Visit Vitals  /77 (BP Location: Left arm, Patient Position: Sitting)   Pulse 71   Ht 1.702 m (5' 7\")   Wt 110 kg (242 lb)   SpO2 95%   BMI 37.90 kg/m²   Smoking Status Some Days   BSA 2.28 m²     Physical Exam  Constitutional:       General: He is not in acute distress.     Appearance: Normal appearance.   HENT:      Head: Normocephalic and atraumatic.      Nose: Nose normal.   Eyes:      Extraocular Movements: Extraocular movements intact.      Conjunctiva/sclera: Conjunctivae normal.   Cardiovascular:      Rate and Rhythm: Normal rate and regular rhythm.   Pulmonary:      Effort: Pulmonary effort is normal.      Breath sounds: Normal breath sounds.   Skin:     General: Skin is warm.   Neurological:      Mental Status: He is alert and oriented to person, place, and time.   Psychiatric:         Mood and Affect: Mood normal.         Behavior: Behavior normal.   Immunization History   Administered Date(s) Administered    AS03 " Adjuvant 11/19/2019    Flu vaccine, quadrivalent, high-dose, preservative free, age 65y+ (FLUZONE) 09/19/2022, 10/05/2023    Influenza Nasal, Unspecified 12/10/2009, 09/04/2013    Influenza, High Dose Seasonal, Preservative Free 12/11/2017, 09/12/2018, 10/03/2018, 09/10/2019, 09/23/2022    Influenza, Seasonal, Quadrivalent, Adjuvanted 10/25/2021    Influenza, Unspecified 12/10/2009, 09/04/2013    Influenza, trivalent, adjuvanted 11/19/2019    Moderna SARS-CoV-2 Vaccination 01/24/2021, 02/24/2021, 12/28/2021    Pneumococcal conjugate vaccine, 13-valent (PREVNAR 13) 06/23/2017    Pneumococcal polysaccharide vaccine, 23-valent, age 2 years and older (PNEUMOVAX 23) 05/10/2010, 04/17/2013    Td vaccine, age 7 years and older (TDVAX) 08/22/2007    Tdap vaccine, age 7 year and older (BOOSTRIX, ADACEL) 08/22/2007    Zoster vaccine, recombinant, adult (SHINGRIX) 07/29/2019, 10/01/2019, 10/07/2019, 12/01/2019       Review of Systems    No visits with results within 4 Month(s) from this visit.   Latest known visit with results is:   Lab on 05/17/2023   Component Date Value Ref Range Status    Prostate Specific Antigen,Screen 05/17/2023 1.53  0.00 - 4.00 ng/mL Final    WBC, Urine 05/17/2023 1  0 - 5 /HPF Final    RBC, Urine 05/17/2023 None  0 - 5 /HPF Final    Squamous Epithelial Cells, Urine 05/17/2023 <1  /HPF Final    Mucus, Urine 05/17/2023 1+  /LPF Final    TSH 05/17/2023 1.60  0.44 - 3.98 mIU/L Final    Cholesterol 05/17/2023 148  0 - 199 mg/dL Final    HDL 05/17/2023 44.7  mg/dL Final    Cholesterol/HDL Ratio 05/17/2023 3.3   Final    LDL 05/17/2023 88  0 - 99 mg/dL Final    VLDL 05/17/2023 15  0 - 40 mg/dL Final    Triglycerides 05/17/2023 76  0 - 149 mg/dL Final    Glucose 05/17/2023 110 (H)  74 - 99 mg/dL Final    Sodium 05/17/2023 141  136 - 145 mmol/L Final    Potassium 05/17/2023 5.1  3.5 - 5.3 mmol/L Final    Chloride 05/17/2023 104  98 - 107 mmol/L Final    Bicarbonate 05/17/2023 28  21 - 32 mmol/L Final    Anion  Gap 05/17/2023 14  10 - 20 mmol/L Final    Urea Nitrogen 05/17/2023 20  6 - 23 mg/dL Final    Creatinine 05/17/2023 1.50 (H)  0.50 - 1.30 mg/dL Final    GFR MALE 05/17/2023 47 (A)  >90 mL/min/1.73m2 Final    Calcium 05/17/2023 10.1  8.6 - 10.6 mg/dL Final    Albumin 05/17/2023 4.6  3.4 - 5.0 g/dL Final    Alkaline Phosphatase 05/17/2023 87  33 - 136 U/L Final    Total Protein 05/17/2023 7.8  6.4 - 8.2 g/dL Final    AST 05/17/2023 22  9 - 39 U/L Final    Total Bilirubin 05/17/2023 0.5  0.0 - 1.2 mg/dL Final    ALT (SGPT) 05/17/2023 18  10 - 52 U/L Final    WBC 05/17/2023 7.4  4.4 - 11.3 x10E9/L Final    nRBC 05/17/2023 0.0  0.0 - 0.0 /100 WBC Final    RBC 05/17/2023 5.27  4.50 - 5.90 x10E12/L Final    Hemoglobin 05/17/2023 15.4  13.5 - 17.5 g/dL Final    Hematocrit 05/17/2023 50.6  41.0 - 52.0 % Final    MCV 05/17/2023 96  80 - 100 fL Final    MCHC 05/17/2023 30.4 (L)  32.0 - 36.0 g/dL Final    Platelets 05/17/2023 265  150 - 450 x10E9/L Final    RDW 05/17/2023 13.2  11.5 - 14.5 % Final       Radiology: Reviewed imaging in powerchart.  No results found.    Family History   Problem Relation Name Age of Onset    Hypertension Mother Margaret Sahu      Social History     Socioeconomic History    Marital status:      Spouse name: None    Number of children: None    Years of education: None    Highest education level: None   Occupational History    None   Tobacco Use    Smoking status: Some Days     Types: Cigars    Smokeless tobacco: Never    Tobacco comments:     5-6 cigars every week for 50years   Substance and Sexual Activity    Alcohol use: Yes     Alcohol/week: 7.0 standard drinks of alcohol     Types: 2 Cans of beer, 5 Shots of liquor per week    Drug use: Never    Sexual activity: Not Currently     Partners: Female   Other Topics Concern    None   Social History Narrative    None     Social Determinants of Health     Financial Resource Strain: Not on file   Food Insecurity: Not on file   Transportation  Needs: Not on file   Physical Activity: Not on file   Stress: Not on file   Social Connections: Not on file   Intimate Partner Violence: Not on file   Housing Stability: Not on file     Past Medical History:   Diagnosis Date    Cataract 2008    Glaucoma 1995    Hypertension 1990     Past Surgical History:   Procedure Laterality Date    CATARACT EXTRACTION  06/09/2014    Cataract Surgery    CIRCUMCISION, PRIMARY  1944    FRACTURE SURGERY  1954    JOINT REPLACEMENT  1995    OTHER SURGICAL HISTORY  06/09/2014    Treatment Of Femoral Fracture    OTHER SURGICAL HISTORY  06/09/2014    Incision And Drainage Of Skin Abscess Neck    OTHER SURGICAL HISTORY  06/09/2014    Biopsy Skin    OTHER SURGICAL HISTORY  09/28/2022    Shoulder replacement    TOTAL HIP ARTHROPLASTY  06/09/2014    Total Hip Replacement       Charting was completed using voice recognition technology and may include unintended errors.

## 2024-06-17 ENCOUNTER — LAB (OUTPATIENT)
Dept: LAB | Facility: LAB | Age: 79
End: 2024-06-17
Payer: MEDICARE

## 2024-06-17 DIAGNOSIS — E78.00 HYPERCHOLESTEROLEMIA: ICD-10-CM

## 2024-06-17 DIAGNOSIS — I10 BENIGN ESSENTIAL HYPERTENSION: ICD-10-CM

## 2024-06-17 DIAGNOSIS — Z12.5 ENCOUNTER FOR SCREENING FOR MALIGNANT NEOPLASM OF PROSTATE: ICD-10-CM

## 2024-06-17 DIAGNOSIS — Z00.00 VISIT FOR PREVENTIVE HEALTH EXAMINATION: ICD-10-CM

## 2024-06-17 LAB
ALBUMIN SERPL BCP-MCNC: 4.5 G/DL (ref 3.4–5)
ALP SERPL-CCNC: 72 U/L (ref 33–136)
ALT SERPL W P-5'-P-CCNC: 18 U/L (ref 10–52)
ANION GAP SERPL CALC-SCNC: 14 MMOL/L (ref 10–20)
APPEARANCE UR: CLEAR
AST SERPL W P-5'-P-CCNC: 19 U/L (ref 9–39)
BILIRUB SERPL-MCNC: 0.5 MG/DL (ref 0–1.2)
BILIRUB UR STRIP.AUTO-MCNC: NEGATIVE MG/DL
BUN SERPL-MCNC: 27 MG/DL (ref 6–23)
CALCIUM SERPL-MCNC: 9.8 MG/DL (ref 8.6–10.6)
CHLORIDE SERPL-SCNC: 106 MMOL/L (ref 98–107)
CHOLEST SERPL-MCNC: 154 MG/DL (ref 0–199)
CHOLESTEROL/HDL RATIO: 3.6
CO2 SERPL-SCNC: 26 MMOL/L (ref 21–32)
COLOR UR: ABNORMAL
CREAT SERPL-MCNC: 1.65 MG/DL (ref 0.5–1.3)
EGFRCR SERPLBLD CKD-EPI 2021: 42 ML/MIN/1.73M*2
ERYTHROCYTE [DISTWIDTH] IN BLOOD BY AUTOMATED COUNT: 13.3 % (ref 11.5–14.5)
GLUCOSE SERPL-MCNC: 113 MG/DL (ref 74–99)
GLUCOSE UR STRIP.AUTO-MCNC: ABNORMAL MG/DL
HCT VFR BLD AUTO: 47.9 % (ref 41–52)
HDLC SERPL-MCNC: 42.7 MG/DL
HGB BLD-MCNC: 14.7 G/DL (ref 13.5–17.5)
KETONES UR STRIP.AUTO-MCNC: NEGATIVE MG/DL
LDLC SERPL CALC-MCNC: 94 MG/DL
LEUKOCYTE ESTERASE UR QL STRIP.AUTO: NEGATIVE
MCH RBC QN AUTO: 29.6 PG (ref 26–34)
MCHC RBC AUTO-ENTMCNC: 30.7 G/DL (ref 32–36)
MCV RBC AUTO: 96 FL (ref 80–100)
NITRITE UR QL STRIP.AUTO: NEGATIVE
NON HDL CHOLESTEROL: 111 MG/DL (ref 0–149)
NRBC BLD-RTO: 0 /100 WBCS (ref 0–0)
PH UR STRIP.AUTO: 5.5 [PH]
PLATELET # BLD AUTO: 251 X10*3/UL (ref 150–450)
POTASSIUM SERPL-SCNC: 5.1 MMOL/L (ref 3.5–5.3)
PROT SERPL-MCNC: 7.2 G/DL (ref 6.4–8.2)
PROT UR STRIP.AUTO-MCNC: NEGATIVE MG/DL
PSA SERPL-MCNC: 2.37 NG/ML
RBC # BLD AUTO: 4.97 X10*6/UL (ref 4.5–5.9)
RBC # UR STRIP.AUTO: NEGATIVE /UL
SODIUM SERPL-SCNC: 141 MMOL/L (ref 136–145)
SP GR UR STRIP.AUTO: 1.02
TRIGL SERPL-MCNC: 88 MG/DL (ref 0–149)
TSH SERPL-ACNC: 1.59 MIU/L (ref 0.44–3.98)
UROBILINOGEN UR STRIP.AUTO-MCNC: NORMAL MG/DL
VLDL: 18 MG/DL (ref 0–40)
WBC # BLD AUTO: 7.1 X10*3/UL (ref 4.4–11.3)

## 2024-06-17 PROCEDURE — 36415 COLL VENOUS BLD VENIPUNCTURE: CPT

## 2024-06-17 PROCEDURE — 81003 URINALYSIS AUTO W/O SCOPE: CPT

## 2024-06-17 PROCEDURE — 84443 ASSAY THYROID STIM HORMONE: CPT

## 2024-06-17 PROCEDURE — 85027 COMPLETE CBC AUTOMATED: CPT

## 2024-06-17 PROCEDURE — 80061 LIPID PANEL: CPT

## 2024-06-17 PROCEDURE — G0103 PSA SCREENING: HCPCS

## 2024-06-17 PROCEDURE — 80053 COMPREHEN METABOLIC PANEL: CPT

## 2024-07-29 DIAGNOSIS — K21.9 GASTROESOPHAGEAL REFLUX DISEASE WITHOUT ESOPHAGITIS: ICD-10-CM

## 2024-07-29 DIAGNOSIS — E78.00 HYPERCHOLESTEROLEMIA: ICD-10-CM

## 2024-07-29 DIAGNOSIS — N40.1 BENIGN PROSTATIC HYPERPLASIA WITH LOWER URINARY TRACT SYMPTOMS, SYMPTOM DETAILS UNSPECIFIED: ICD-10-CM

## 2024-07-29 DIAGNOSIS — I10 BENIGN ESSENTIAL HYPERTENSION: ICD-10-CM

## 2024-07-30 RX ORDER — AMLODIPINE BESYLATE 10 MG/1
10 TABLET ORAL DAILY
Qty: 90 TABLET | Refills: 2 | Status: SHIPPED | OUTPATIENT
Start: 2024-07-30 | End: 2025-07-30

## 2024-07-30 RX ORDER — OMEPRAZOLE 20 MG/1
20 CAPSULE, DELAYED RELEASE ORAL
Qty: 90 CAPSULE | Refills: 2 | Status: SHIPPED | OUTPATIENT
Start: 2024-07-30 | End: 2025-07-30

## 2024-07-30 RX ORDER — LISINOPRIL 20 MG/1
20 TABLET ORAL DAILY
Qty: 90 TABLET | Refills: 2 | Status: SHIPPED | OUTPATIENT
Start: 2024-07-30 | End: 2025-07-30

## 2024-07-30 RX ORDER — ATORVASTATIN CALCIUM 10 MG/1
10 TABLET, FILM COATED ORAL DAILY
Qty: 90 TABLET | Refills: 2 | Status: SHIPPED | OUTPATIENT
Start: 2024-07-30 | End: 2025-07-30

## 2024-07-30 RX ORDER — TAMSULOSIN HYDROCHLORIDE 0.4 MG/1
0.4 CAPSULE ORAL NIGHTLY
Qty: 90 CAPSULE | Refills: 2 | Status: SHIPPED | OUTPATIENT
Start: 2024-07-30 | End: 2025-07-30

## 2024-09-04 ENCOUNTER — TELEPHONE (OUTPATIENT)
Dept: PRIMARY CARE | Facility: CLINIC | Age: 79
End: 2024-09-04

## 2024-09-05 DIAGNOSIS — T78.40XA ALLERGY, INITIAL ENCOUNTER: ICD-10-CM

## 2025-05-08 DIAGNOSIS — K64.9 ACUTE HEMORRHOID: ICD-10-CM

## 2025-05-09 RX ORDER — HYDROCORTISONE 25 MG/G
CREAM TOPICAL
Qty: 28 G | Refills: 2 | Status: SHIPPED | OUTPATIENT
Start: 2025-05-09 | End: 2025-05-24

## 2025-05-19 ENCOUNTER — APPOINTMENT (OUTPATIENT)
Dept: PRIMARY CARE | Facility: CLINIC | Age: 80
End: 2025-05-19
Payer: MEDICARE

## 2025-05-19 VITALS
BODY MASS INDEX: 40.02 KG/M2 | SYSTOLIC BLOOD PRESSURE: 134 MMHG | TEMPERATURE: 98 F | HEART RATE: 68 BPM | HEIGHT: 67 IN | OXYGEN SATURATION: 94 % | DIASTOLIC BLOOD PRESSURE: 71 MMHG | WEIGHT: 255 LBS

## 2025-05-19 DIAGNOSIS — E66.812 CLASS 2 DRUG-INDUCED OBESITY WITHOUT SERIOUS COMORBIDITY WITH BODY MASS INDEX (BMI) OF 39.0 TO 39.9 IN ADULT: ICD-10-CM

## 2025-05-19 DIAGNOSIS — M54.50 CHRONIC BILATERAL LOW BACK PAIN WITHOUT SCIATICA: ICD-10-CM

## 2025-05-19 DIAGNOSIS — E66.1 CLASS 2 DRUG-INDUCED OBESITY WITHOUT SERIOUS COMORBIDITY WITH BODY MASS INDEX (BMI) OF 39.0 TO 39.9 IN ADULT: ICD-10-CM

## 2025-05-19 DIAGNOSIS — Z00.00 VISIT FOR PREVENTIVE HEALTH EXAMINATION: ICD-10-CM

## 2025-05-19 DIAGNOSIS — G89.29 CHRONIC BILATERAL LOW BACK PAIN WITHOUT SCIATICA: ICD-10-CM

## 2025-05-19 DIAGNOSIS — N40.1 BENIGN PROSTATIC HYPERPLASIA WITH LOWER URINARY TRACT SYMPTOMS, SYMPTOM DETAILS UNSPECIFIED: ICD-10-CM

## 2025-05-19 DIAGNOSIS — E78.00 HYPERCHOLESTEROLEMIA: ICD-10-CM

## 2025-05-19 DIAGNOSIS — Z12.5 ENCOUNTER FOR SCREENING FOR MALIGNANT NEOPLASM OF PROSTATE: ICD-10-CM

## 2025-05-19 DIAGNOSIS — I10 BENIGN ESSENTIAL HYPERTENSION: ICD-10-CM

## 2025-05-19 DIAGNOSIS — N18.32 STAGE 3B CHRONIC KIDNEY DISEASE (MULTI): Primary | ICD-10-CM

## 2025-05-19 DIAGNOSIS — K21.9 GASTROESOPHAGEAL REFLUX DISEASE WITHOUT ESOPHAGITIS: ICD-10-CM

## 2025-05-19 PROBLEM — N18.30 CHRONIC KIDNEY DISEASE, STAGE 3 (MULTI): Status: RESOLVED | Noted: 2023-05-17 | Resolved: 2025-05-19

## 2025-05-19 RX ORDER — TAMSULOSIN HYDROCHLORIDE 0.4 MG/1
0.4 CAPSULE ORAL NIGHTLY
Qty: 90 CAPSULE | Refills: 2 | Status: SHIPPED | OUTPATIENT
Start: 2025-05-19 | End: 2026-05-19

## 2025-05-19 RX ORDER — LISINOPRIL 20 MG/1
20 TABLET ORAL DAILY
Qty: 90 TABLET | Refills: 2 | Status: SHIPPED | OUTPATIENT
Start: 2025-05-19 | End: 2026-05-19

## 2025-05-19 RX ORDER — BETAMETHASONE DIPROPIONATE 0.5 MG/G
CREAM TOPICAL
COMMUNITY
Start: 2025-02-08

## 2025-05-19 RX ORDER — ALBUTEROL SULFATE 90 UG/1
2 INHALANT RESPIRATORY (INHALATION) EVERY 4 HOURS PRN
COMMUNITY
Start: 2025-05-01 | End: 2026-05-01

## 2025-05-19 RX ORDER — FLUTICASONE PROPIONATE AND SALMETEROL 250; 50 UG/1; UG/1
1 POWDER RESPIRATORY (INHALATION) 2 TIMES DAILY
COMMUNITY
Start: 2025-05-01

## 2025-05-19 RX ORDER — AMLODIPINE BESYLATE 10 MG/1
10 TABLET ORAL DAILY
Qty: 90 TABLET | Refills: 2 | Status: SHIPPED | OUTPATIENT
Start: 2025-05-19 | End: 2026-05-19

## 2025-05-19 RX ORDER — OMEPRAZOLE 20 MG/1
20 CAPSULE, DELAYED RELEASE ORAL
Qty: 90 CAPSULE | Refills: 2 | Status: SHIPPED | OUTPATIENT
Start: 2025-05-19 | End: 2026-05-19

## 2025-05-19 RX ORDER — ATORVASTATIN CALCIUM 10 MG/1
10 TABLET, FILM COATED ORAL DAILY
Qty: 90 TABLET | Refills: 2 | Status: SHIPPED | OUTPATIENT
Start: 2025-05-19 | End: 2026-05-19

## 2025-05-19 ASSESSMENT — PATIENT HEALTH QUESTIONNAIRE - PHQ9
2. FEELING DOWN, DEPRESSED OR HOPELESS: NOT AT ALL
1. LITTLE INTEREST OR PLEASURE IN DOING THINGS: NOT AT ALL
SUM OF ALL RESPONSES TO PHQ9 QUESTIONS 1 AND 2: 0

## 2025-05-19 ASSESSMENT — ACTIVITIES OF DAILY LIVING (ADL)
TAKING_MEDICATION: INDEPENDENT
BATHING: INDEPENDENT
MANAGING_FINANCES: INDEPENDENT
DRESSING: INDEPENDENT
GROCERY_SHOPPING: INDEPENDENT
DOING_HOUSEWORK: INDEPENDENT

## 2025-05-19 ASSESSMENT — LIFESTYLE VARIABLES
HOW OFTEN DO YOU HAVE SIX OR MORE DRINKS ON ONE OCCASION: NEVER
HOW OFTEN DO YOU HAVE A DRINK CONTAINING ALCOHOL: 4 OR MORE TIMES A WEEK
AUDIT-C TOTAL SCORE: 4
HOW MANY STANDARD DRINKS CONTAINING ALCOHOL DO YOU HAVE ON A TYPICAL DAY: 1 OR 2
SKIP TO QUESTIONS 9-10: 1

## 2025-05-19 NOTE — PROGRESS NOTES
Subjective   Patient ID: Freedom Sahu is a 79 y.o. male who presents for Medicare Annual Wellness Visit Subsequent (Pt is fasting).    Assessment/Plan     Problem List Items Addressed This Visit       Visit for preventive health examination    Relevant Orders    Prostate Specific Antigen, Screen    TSH with reflex to Free T4 if abnormal    Lipid Panel    Comprehensive Metabolic Panel    CBC    Urinalysis with Reflex Microscopic    Benign essential hypertension    Relevant Medications    lisinopril 20 mg tablet    amLODIPine (Norvasc) 10 mg tablet    Other Relevant Orders    TSH with reflex to Free T4 if abnormal    Lipid Panel    Comprehensive Metabolic Panel    CBC    Urinalysis with Reflex Microscopic    BPH (benign prostatic hyperplasia)    Relevant Medications    tamsulosin (Flomax) 0.4 mg 24 hr capsule    GERD (gastroesophageal reflux disease)    Relevant Medications    omeprazole (PriLOSEC) 20 mg DR capsule    Hypercholesterolemia    Relevant Medications    atorvastatin (Lipitor) 10 mg tablet    Other Relevant Orders    TSH with reflex to Free T4 if abnormal    Lipid Panel    Comprehensive Metabolic Panel    CBC    Urinalysis with Reflex Microscopic    Stage 3b chronic kidney disease (Multi) - Primary    Class 2 drug-induced obesity without serious comorbidity with body mass index (BMI) of 39.0 to 39.9 in adult     Other Visit Diagnoses         Encounter for screening for malignant neoplasm of prostate        Relevant Orders    Prostate Specific Antigen, Screen      Chronic bilateral low back pain without sciatica        Relevant Orders    Referral to Physical Therapy          Time spent around 15 minutes obtaining and discussing depression screening using PHQ 9 questions with results documented in the chart    Colonoscopy I n 2021    Up-to-date with pneumonia vaccine and received COVID-19 vaccine  Fasting lab work today  Discussed about diet physical activity weight loss  Discussed about aspirin  use  Discussed about diet and exercise    DEXA scan- 2017 within normal limits  AAA screening -2017 no acute abnormality  Lab work and call patient with abnormal result  Come back early with any new sign and symptoms  Patient understands and in agreement with plan.    FOLLOW UP IN 6 MONTHS.      HPI      79-year-old male here for Medicare wellness visit and follow-up on    GERD  BPH  Vitamin D deficiency  Hypertension  Hyperlipidemia   Glaucoma  MAL using CPAP  asthma  Cigar smoker  Obesity  Tubular Adenoma -colonoscopy done in 2020  Osteoarthritis status post bilateral knee and bilateral hip replacement and left shoulder  Chronic low back pain  Generalized arthralgias secondary to osteoarthritis    Ckd stage 3 - seeping dr. Stewart - was ref to he-onc    Chronic left sided neck pain - more on left side - no weakness      Complaining of low back pain mostly secondary to degenerative disc disease with facet arthritis discussed about physical therapy if remains symptomatic will consider x-ray pain management consultation for possible injection patient agrees  Otherwise no new sign and symptoms     Advance directives:. Advanced Care Planning discussed and documented advance care plan or surrogate decision maker documented in the medical record.   A Conversation about Advance directives of at least 16 minutes has occurred. Actual time spent: I spent >16 minutes discussing Advance Care Planning including the explanation and discussion of advance directives. If patient does not have current up to date documents, examples and information provided on how to create both Living Will and Power of . Patient was encouraged to work on completing these documents.  Conversation with: The conversation with the patient and/or participants was voluntary.  Documents discussed and/or completed: Living Will was discussed with participants. Healthcare POA was discussed with participants. Patient educated on how to obtain Living Will  and Power of . Discussed patient end of life choices.   Patient has living will and patient's daughter hay is healthcare power of .  Patient is full code.    No Known Allergies    Current Outpatient Medications   Medication Sig Dispense Refill    acetaminophen (Tylenol 8 Hour) 650 mg ER tablet Take by mouth.      albuterol 90 mcg/actuation inhaler Inhale 2 puffs every 4 hours if needed.      betamethasone, augmented, (Diprolene AF) 0.05 % cream Apply a thin layer to affected areas on leg twice a day x2 week/month      calcitriol (Rocaltrol) 0.25 mcg capsule Take 1 capsule (0.25 mcg) by mouth.      cholecalciferol (Vitamin D-3) 25 MCG (1000 UT) tablet Take by mouth.      coenzyme Q-10 100 mg capsule Take 1 capsule (100 mg) by mouth twice a day.      dapagliflozin (Farxiga) 10 mg Take 1 tablet (10 mg) by mouth once daily. 14 tablet 0    fluticasone (Flonase) 50 mcg/actuation nasal spray Administer 1 spray into affected nostril(s) 2 times a day.      fluticasone propion-salmeteroL (Advair Diskus) 250-50 mcg/dose diskus inhaler Inhale 1 puff twice a day.      hydrocortisone 2.5 % cream Apply topically 3 times a day for 15 days. 28 g 2    latanoprost (Xalatan) 0.005 % ophthalmic solution       amLODIPine (Norvasc) 10 mg tablet Take 1 tablet (10 mg) by mouth once daily. 90 tablet 2    atorvastatin (Lipitor) 10 mg tablet Take 1 tablet (10 mg) by mouth once daily. 90 tablet 2    lisinopril 20 mg tablet Take 1 tablet (20 mg) by mouth once daily. 90 tablet 2    loratadine (Claritin) 10 mg tablet Take 1 tablet (10 mg) by mouth once daily. (Patient not taking: Reported on 5/19/2025) 30 tablet 0    montelukast (Singulair) 10 mg tablet Take 1 tablet (10 mg) by mouth once daily at bedtime. (Patient not taking: Reported on 5/19/2025) 30 tablet 0    omeprazole (PriLOSEC) 20 mg DR capsule Take 1 capsule (20 mg) by mouth once daily. 90 capsule 2    tamsulosin (Flomax) 0.4 mg 24 hr capsule Take 1 capsule (0.4 mg) by  "mouth once daily at bedtime. 90 capsule 2     No current facility-administered medications for this visit.       Objective   Visit Vitals  /71 (BP Location: Left arm, Patient Position: Sitting)   Pulse 68   Temp 36.7 °C (98 °F)   Ht 1.702 m (5' 7\")   Wt 116 kg (255 lb)   SpO2 94%   BMI 39.94 kg/m²   Smoking Status Some Days   BSA 2.34 m²     Physical Exam  Constitutional:       General: He is not in acute distress.     Appearance: Normal appearance.   HENT:      Head: Normocephalic and atraumatic.      Nose: Nose normal.   Eyes:      Extraocular Movements: Extraocular movements intact.      Conjunctiva/sclera: Conjunctivae normal.   Cardiovascular:      Rate and Rhythm: Normal rate and regular rhythm.   Pulmonary:      Effort: Pulmonary effort is normal.      Breath sounds: Normal breath sounds.   Skin:     General: Skin is warm.   Neurological:      Mental Status: He is alert and oriented to person, place, and time.   Psychiatric:         Mood and Affect: Mood normal.         Behavior: Behavior normal.   Immunization History   Administered Date(s) Administered    AS03 Adjuvant 11/19/2019    Flu vaccine (IIV4), preservative free *Check age/dose* 12/11/2017, 09/12/2018, 10/03/2018, 09/10/2019, 11/19/2019, 10/25/2021, 09/19/2022, 10/05/2023    Flu vaccine, quadrivalent, high-dose, preservative free, age 65y+ (FLUZONE) 09/19/2022, 10/05/2023    Flu vaccine, trivalent, preservative free, HIGH-DOSE, age 65y+ (Fluzone) 12/11/2017, 09/12/2018, 10/03/2018, 09/10/2019, 09/23/2022    Flu vaccine, trivalent, preservative free, age 6 months and greater (Fluarix/Fluzone/Flulaval) 10/01/2013, 09/23/2020    Influenza Nasal, Unspecified 12/10/2009, 09/04/2013    Influenza, Seasonal, Quadrivalent, Adjuvanted 10/25/2021    Influenza, Unspecified 12/10/2009, 09/04/2013    Influenza, seasonal, injectable 12/10/2009, 09/04/2013, 09/23/2022    Influenza, trivalent, adjuvanted 11/19/2019, 10/25/2024    Moderna SARS-CoV-2 Vaccination " 01/24/2021, 02/24/2021, 12/28/2021    Pneumococcal conjugate vaccine, 13-valent (PREVNAR 13) 06/23/2017    Pneumococcal polysaccharide vaccine, 23-valent, age 2 years and older (PNEUMOVAX 23) 05/10/2010, 04/17/2013    Td vaccine, age 7 years and older (TDVAX) 08/22/2007    Tdap vaccine, age 7 year and older (BOOSTRIX, ADACEL) 08/22/2007    Zoster vaccine, recombinant, adult (SHINGRIX) 07/29/2019, 10/01/2019, 10/07/2019, 12/01/2019    Zoster, Unspecified 07/29/2019, 10/01/2019, 10/07/2019, 12/01/2019       Review of Systems    No visits with results within 4 Month(s) from this visit.   Latest known visit with results is:   Lab on 06/17/2024   Component Date Value Ref Range Status    Prostate Specific Antigen,Screen 06/17/2024 2.37  <=4.00 ng/mL Final    Thyroid Stimulating Hormone 06/17/2024 1.59  0.44 - 3.98 mIU/L Final    Cholesterol 06/17/2024 154  0 - 199 mg/dL Final    HDL-Cholesterol 06/17/2024 42.7  mg/dL Final    Cholesterol/HDL Ratio 06/17/2024 3.6   Final    LDL Calculated 06/17/2024 94  <=99 mg/dL Final    VLDL 06/17/2024 18  0 - 40 mg/dL Final    Triglycerides 06/17/2024 88  0 - 149 mg/dL Final    Non HDL Cholesterol 06/17/2024 111  0 - 149 mg/dL Final    Glucose 06/17/2024 113 (H)  74 - 99 mg/dL Final    Sodium 06/17/2024 141  136 - 145 mmol/L Final    Potassium 06/17/2024 5.1  3.5 - 5.3 mmol/L Final    Chloride 06/17/2024 106  98 - 107 mmol/L Final    Bicarbonate 06/17/2024 26  21 - 32 mmol/L Final    Anion Gap 06/17/2024 14  10 - 20 mmol/L Final    Urea Nitrogen 06/17/2024 27 (H)  6 - 23 mg/dL Final    Creatinine 06/17/2024 1.65 (H)  0.50 - 1.30 mg/dL Final    eGFR 06/17/2024 42 (L)  >60 mL/min/1.73m*2 Final    Calcium 06/17/2024 9.8  8.6 - 10.6 mg/dL Final    Albumin 06/17/2024 4.5  3.4 - 5.0 g/dL Final    Alkaline Phosphatase 06/17/2024 72  33 - 136 U/L Final    Total Protein 06/17/2024 7.2  6.4 - 8.2 g/dL Final    AST 06/17/2024 19  9 - 39 U/L Final    Bilirubin, Total 06/17/2024 0.5  0.0 - 1.2  mg/dL Final    ALT 06/17/2024 18  10 - 52 U/L Final    WBC 06/17/2024 7.1  4.4 - 11.3 x10*3/uL Final    nRBC 06/17/2024 0.0  0.0 - 0.0 /100 WBCs Final    RBC 06/17/2024 4.97  4.50 - 5.90 x10*6/uL Final    Hemoglobin 06/17/2024 14.7  13.5 - 17.5 g/dL Final    Hematocrit 06/17/2024 47.9  41.0 - 52.0 % Final    MCV 06/17/2024 96  80 - 100 fL Final    MCH 06/17/2024 29.6  26.0 - 34.0 pg Final    MCHC 06/17/2024 30.7 (L)  32.0 - 36.0 g/dL Final    RDW 06/17/2024 13.3  11.5 - 14.5 % Final    Platelets 06/17/2024 251  150 - 450 x10*3/uL Final    Color, Urine 06/17/2024 Light-Yellow  Light-Yellow, Yellow, Dark-Yellow Final    Appearance, Urine 06/17/2024 Clear  Clear Final    Specific Gravity, Urine 06/17/2024 1.019  1.005 - 1.035 Final    pH, Urine 06/17/2024 5.5  5.0, 5.5, 6.0, 6.5, 7.0, 7.5, 8.0 Final    Protein, Urine 06/17/2024 NEGATIVE  NEGATIVE, 10 (TRACE), 20 (TRACE) mg/dL Final    Glucose, Urine 06/17/2024 1000 (4+) (A)  Normal mg/dL Final    Blood, Urine 06/17/2024 NEGATIVE  NEGATIVE Final    Ketones, Urine 06/17/2024 NEGATIVE  NEGATIVE mg/dL Final    Bilirubin, Urine 06/17/2024 NEGATIVE  NEGATIVE Final    Urobilinogen, Urine 06/17/2024 Normal  Normal mg/dL Final    Nitrite, Urine 06/17/2024 NEGATIVE  NEGATIVE Final    Leukocyte Esterase, Urine 06/17/2024 NEGATIVE  NEGATIVE Final       Radiology: Reviewed imaging in powerchart.  No results found.    Family History   Problem Relation Name Age of Onset    Hypertension Mother Margaret Sahu      Social History     Socioeconomic History    Marital status:    Tobacco Use    Smoking status: Some Days     Current packs/day: 0.00     Types: Cigars, Cigarettes    Smokeless tobacco: Never    Tobacco comments:     5-6 cigars every week for 50years   Substance and Sexual Activity    Alcohol use: Yes     Alcohol/week: 7.0 standard drinks of alcohol     Types: 7 Shots of liquor per week    Drug use: Never    Sexual activity: Not Currently     Partners: Female     Past  Medical History:   Diagnosis Date    Cataract 2008    Glaucoma 1995    Hypertension 1990     Past Surgical History:   Procedure Laterality Date    CATARACT EXTRACTION  06/09/2014    Cataract Surgery    CIRCUMCISION, PRIMARY  1944    FRACTURE SURGERY  1954    JOINT REPLACEMENT  1995    OTHER SURGICAL HISTORY  06/09/2014    Treatment Of Femoral Fracture    OTHER SURGICAL HISTORY  06/09/2014    Incision And Drainage Of Skin Abscess Neck    OTHER SURGICAL HISTORY  06/09/2014    Biopsy Skin    OTHER SURGICAL HISTORY  09/28/2022    Shoulder replacement    TOTAL HIP ARTHROPLASTY  06/09/2014    Total Hip Replacement       Charting was completed using voice recognition technology and may include unintended errors.

## 2025-05-20 LAB
ALBUMIN SERPL-MCNC: 4.7 G/DL (ref 3.6–5.1)
ALP SERPL-CCNC: 71 U/L (ref 35–144)
ALT SERPL-CCNC: 21 U/L (ref 9–46)
ANION GAP SERPL CALCULATED.4IONS-SCNC: 8 MMOL/L (CALC) (ref 7–17)
APPEARANCE UR: CLEAR
AST SERPL-CCNC: 22 U/L (ref 10–35)
BILIRUB SERPL-MCNC: 0.6 MG/DL (ref 0.2–1.2)
BILIRUB UR QL STRIP: NEGATIVE
BUN SERPL-MCNC: 20 MG/DL (ref 7–25)
CALCIUM SERPL-MCNC: 9.7 MG/DL (ref 8.6–10.3)
CHLORIDE SERPL-SCNC: 105 MMOL/L (ref 98–110)
CHOLEST SERPL-MCNC: 153 MG/DL
CHOLEST/HDLC SERPL: 3.5 (CALC)
CO2 SERPL-SCNC: 27 MMOL/L (ref 20–32)
COLOR UR: YELLOW
CREAT SERPL-MCNC: 1.38 MG/DL (ref 0.7–1.22)
EGFRCR SERPLBLD CKD-EPI 2021: 52 ML/MIN/1.73M2
ERYTHROCYTE [DISTWIDTH] IN BLOOD BY AUTOMATED COUNT: 13.9 % (ref 11–15)
GLUCOSE SERPL-MCNC: 105 MG/DL (ref 65–99)
GLUCOSE UR QL STRIP: ABNORMAL
HCT VFR BLD AUTO: 48.2 % (ref 38.5–50)
HDLC SERPL-MCNC: 44 MG/DL
HGB BLD-MCNC: 15 G/DL (ref 13.2–17.1)
HGB UR QL STRIP: NEGATIVE
KETONES UR QL STRIP: NEGATIVE
LDLC SERPL CALC-MCNC: 90 MG/DL (CALC)
LEUKOCYTE ESTERASE UR QL STRIP: NEGATIVE
MCH RBC QN AUTO: 29.9 PG (ref 27–33)
MCHC RBC AUTO-ENTMCNC: 31.1 G/DL (ref 32–36)
MCV RBC AUTO: 96.2 FL (ref 80–100)
NITRITE UR QL STRIP: NEGATIVE
NONHDLC SERPL-MCNC: 109 MG/DL (CALC)
PH UR STRIP: 6 [PH] (ref 5–8)
PLATELET # BLD AUTO: 235 THOUSAND/UL (ref 140–400)
PMV BLD REES-ECKER: 9.5 FL (ref 7.5–12.5)
POTASSIUM SERPL-SCNC: 4.6 MMOL/L (ref 3.5–5.3)
PROT SERPL-MCNC: 7.3 G/DL (ref 6.1–8.1)
PROT UR QL STRIP: NEGATIVE
PSA SERPL-MCNC: 1.63 NG/ML
RBC # BLD AUTO: 5.01 MILLION/UL (ref 4.2–5.8)
SODIUM SERPL-SCNC: 140 MMOL/L (ref 135–146)
SP GR UR STRIP: 1.02 (ref 1–1.03)
TRIGL SERPL-MCNC: 91 MG/DL
TSH SERPL-ACNC: 1.57 MIU/L (ref 0.4–4.5)
WBC # BLD AUTO: 5.9 THOUSAND/UL (ref 3.8–10.8)

## 2025-08-12 LAB
NON-UH HIE ALB: 4.2 G/DL (ref 3.4–5)
NON-UH HIE APPEARANCE, U: ABNORMAL
NON-UH HIE BASO COUNT: 0.16 X1000 (ref 0–0.2)
NON-UH HIE BASOS %: 2.6 %
NON-UH HIE BILIRUBIN, U: ABNORMAL
NON-UH HIE BLOOD, U: ABNORMAL
NON-UH HIE BUN/CREAT RATIO: 13.1
NON-UH HIE BUN: 21 MG/DL (ref 9–23)
NON-UH HIE CALCIUM: 9.9 MG/DL (ref 8.7–10.4)
NON-UH HIE CALCULATED OSMOLALITY: 288 MOSM/KG (ref 275–295)
NON-UH HIE CHLORIDE: 109 MMOL/L (ref 98–107)
NON-UH HIE CO2, VENOUS: 26 MMOL/L (ref 20–31)
NON-UH HIE COLOR, U: ABNORMAL
NON-UH HIE CORRECTED CALCIUM: ABNORMAL MG/DL (ref 8.5–10.5)
NON-UH HIE CREATININE, URINE MG/DL: 121.2 MG/DL
NON-UH HIE CREATININE: 1.6 MG/DL (ref 0.6–1.1)
NON-UH HIE DIFF?: NORMAL
NON-UH HIE EOS COUNT: 0.48 X1000 (ref 0–0.5)
NON-UH HIE EOSIN %: 7.8 %
NON-UH HIE GFR AA: 50
NON-UH HIE GFR ESTIMATED: 42
NON-UH HIE GLOMERULAR FILTRATION RATE: 42 ML/MIN/1.73M?
NON-UH HIE GLUCOSE QUAL, U: ABNORMAL
NON-UH HIE GLUCOSE: 136 MG/DL (ref 74–106)
NON-UH HIE HCT: 44.2 % (ref 41–52)
NON-UH HIE HGB: 14.8 G/DL (ref 13.5–17.5)
NON-UH HIE I-PTH: 66 PG/ML (ref 18.4–80.1)
NON-UH HIE INSTR WBC: 6.2
NON-UH HIE K: 4.2 MMOL/L (ref 3.5–5.1)
NON-UH HIE KETONES, U: ABNORMAL
NON-UH HIE LEUKOCYTE ESTERASE, U: ABNORMAL
NON-UH HIE LYMPH %: 15.7 %
NON-UH HIE LYMPH COUNT: 0.97 X1000 (ref 1.2–4.8)
NON-UH HIE MAGNESIUM: 1.9 MG/DL (ref 1.6–2.6)
NON-UH HIE MCH: 31 PG (ref 27–34)
NON-UH HIE MCHC: 33.6 G/DL (ref 32–37)
NON-UH HIE MCV: 92.4 FL (ref 80–100)
NON-UH HIE MICROALBUMIN, URINE MG/L: <3 MG/L
NON-UH HIE MICROALBUMIN/CREATININE RATIO: <2 MG MALB/GM CREAT (ref 0–30)
NON-UH HIE MONO %: 9.4 %
NON-UH HIE MONO COUNT: 0.58 X1000 (ref 0.1–1)
NON-UH HIE MPV: 7.9 FL (ref 7.4–10.4)
NON-UH HIE NA: 142 MMOL/L (ref 135–145)
NON-UH HIE NEUTROPHIL %: 64.5 %
NON-UH HIE NEUTROPHIL COUNT (ANC): 3.97 X1000 (ref 1.4–8.8)
NON-UH HIE NITRITE, U: ABNORMAL
NON-UH HIE NUCLEATED RBC: 0 /100WBC
NON-UH HIE PH, U: 5.5 (ref 4.5–8)
NON-UH HIE PHOSPHORUS: 3 MG/DL (ref 2–5.1)
NON-UH HIE PLATELET: 230 X10 (ref 150–450)
NON-UH HIE PROTEIN, U: ABNORMAL
NON-UH HIE RBC: 4.79 X10 (ref 4.7–6.1)
NON-UH HIE RDW: 13.7 % (ref 11.5–14.5)
NON-UH HIE SPECIFIC GRAVITY, U: 1.03 (ref 1–1.03)
NON-UH HIE U MICRO: ABNORMAL
NON-UH HIE URIC ACID: 5.5 MG/DL (ref 3.7–9.2)
NON-UH HIE UROBILINOGEN QUAL, U: ABNORMAL
NON-UH HIE VIT D 25: 36 NG/ML
NON-UH HIE WBC: 6.2 X10 (ref 4.5–11)